# Patient Record
Sex: FEMALE | Race: OTHER | NOT HISPANIC OR LATINO | URBAN - METROPOLITAN AREA
[De-identification: names, ages, dates, MRNs, and addresses within clinical notes are randomized per-mention and may not be internally consistent; named-entity substitution may affect disease eponyms.]

---

## 2022-04-29 ENCOUNTER — OUTPATIENT (OUTPATIENT)
Dept: OUTPATIENT SERVICES | Facility: HOSPITAL | Age: 32
LOS: 1 days | End: 2022-04-29
Payer: MEDICAID

## 2022-04-29 VITALS
HEIGHT: 61 IN | DIASTOLIC BLOOD PRESSURE: 82 MMHG | WEIGHT: 130.07 LBS | SYSTOLIC BLOOD PRESSURE: 120 MMHG | RESPIRATION RATE: 16 BRPM | TEMPERATURE: 98 F | OXYGEN SATURATION: 98 % | HEART RATE: 88 BPM

## 2022-04-29 DIAGNOSIS — Z01.818 ENCOUNTER FOR OTHER PREPROCEDURAL EXAMINATION: ICD-10-CM

## 2022-04-29 DIAGNOSIS — Z90.49 ACQUIRED ABSENCE OF OTHER SPECIFIED PARTS OF DIGESTIVE TRACT: Chronic | ICD-10-CM

## 2022-04-29 DIAGNOSIS — N84.0 POLYP OF CORPUS UTERI: ICD-10-CM

## 2022-04-29 LAB
HCG SERPL-ACNC: <1 MIU/ML — SIGNIFICANT CHANGE UP
HCT VFR BLD CALC: 44.7 % — SIGNIFICANT CHANGE UP (ref 34.5–45)
HGB BLD-MCNC: 15.2 G/DL — SIGNIFICANT CHANGE UP (ref 11.5–15.5)
MCHC RBC-ENTMCNC: 31.3 PG — SIGNIFICANT CHANGE UP (ref 27–34)
MCHC RBC-ENTMCNC: 34 GM/DL — SIGNIFICANT CHANGE UP (ref 32–36)
MCV RBC AUTO: 92 FL — SIGNIFICANT CHANGE UP (ref 80–100)
NRBC # BLD: 0 /100 WBCS — SIGNIFICANT CHANGE UP (ref 0–0)
PLATELET # BLD AUTO: 236 K/UL — SIGNIFICANT CHANGE UP (ref 150–400)
RBC # BLD: 4.86 M/UL — SIGNIFICANT CHANGE UP (ref 3.8–5.2)
RBC # FLD: 11.7 % — SIGNIFICANT CHANGE UP (ref 10.3–14.5)
WBC # BLD: 6.82 K/UL — SIGNIFICANT CHANGE UP (ref 3.8–10.5)
WBC # FLD AUTO: 6.82 K/UL — SIGNIFICANT CHANGE UP (ref 3.8–10.5)

## 2022-04-29 PROCEDURE — 85027 COMPLETE CBC AUTOMATED: CPT

## 2022-04-29 PROCEDURE — 86850 RBC ANTIBODY SCREEN: CPT

## 2022-04-29 PROCEDURE — G0463: CPT

## 2022-04-29 PROCEDURE — 84702 CHORIONIC GONADOTROPIN TEST: CPT

## 2022-04-29 PROCEDURE — 86901 BLOOD TYPING SEROLOGIC RH(D): CPT

## 2022-04-29 PROCEDURE — 36415 COLL VENOUS BLD VENIPUNCTURE: CPT

## 2022-04-29 PROCEDURE — 86900 BLOOD TYPING SEROLOGIC ABO: CPT

## 2022-04-29 NOTE — H&P PST ADULT - NSANTHOSAYNRD_GEN_A_CORE
No. FAVIAN screening performed.  STOP BANG Legend: 0-2 = LOW Risk; 3-4 = INTERMEDIATE Risk; 5-8 = HIGH Risk

## 2022-04-29 NOTE — H&P PST ADULT - HISTORY OF PRESENT ILLNESS
30 yo F with no significant PMH c/o menorrhagia in 4/2022- had GYN evaluation- s/p pelvic sonogram/HSG revealed polyp of corpus uteri. Pt presents to PST for pre op evaluation for D&C/hysteroscopy on 5/6/22  **Denies any fever, chills, abdominal pain, N/V or sick contacts  **Covid 19 PCR to be scheduled

## 2022-04-29 NOTE — H&P PST ADULT - GUM GEN PE MLT EXAM PC
Called patient no answer.   Unable to leave message.   Will need to try again at later time to convey results.      patient refused

## 2022-04-29 NOTE — H&P PST ADULT - FALL HARM RISK - UNIVERSAL INTERVENTIONS
Bed in lowest position, wheels locked, appropriate side rails in place/Call bell, personal items and telephone in reach/Instruct patient to call for assistance before getting out of bed or chair/Non-slip footwear when patient is out of bed/[place] to call system/Physically safe environment - no spills, clutter or unnecessary equipment/Purposeful Proactive Rounding/Room/bathroom lighting operational, light cord in reach

## 2022-04-29 NOTE — H&P PST ADULT - PRIMARY CARE PROVIDER
Impression:  Encounter for surgical aftercare following surgery on a sense organ  Z48.810. Post operative instructions reviewed - Plan: Reviewed post-op instructions. RTC if any pain or sudden changes to vision. no PCP

## 2022-04-29 NOTE — H&P PST ADULT - NSICDXFAMILYHX_GEN_ALL_CORE_FT
FAMILY HISTORY:  Mother  Still living? Yes, Estimated age: 51-60  FH: HTN (hypertension), Age at diagnosis: Age Unknown  FHx: hypothyroidism, Age at diagnosis: Age Unknown

## 2022-05-05 RX ORDER — SODIUM CHLORIDE 9 MG/ML
1000 INJECTION, SOLUTION INTRAVENOUS
Refills: 0 | Status: DISCONTINUED | OUTPATIENT
Start: 2022-05-06 | End: 2022-05-20

## 2022-05-05 NOTE — ASU PATIENT PROFILE, ADULT - PROVIDER NOTIFICATION
Presenting with Hb of 10.3. MCV 79.9. Could be 2/2 iron deficiency anemia  -iron studies  -obtain collateral about baseline Hb   -maintain active type and screen, transfuse if Hb<7 Presenting with Hb of 10.3. MCV 79.9. Could be 2/2 iron deficiency anemia. May be chronic.   -iron studies  -obtain collateral about baseline Hb   -maintain active type and screen, transfuse if Hb<7 Declines

## 2022-05-05 NOTE — ASU PATIENT PROFILE, ADULT - FALL HARM RISK - UNIVERSAL INTERVENTIONS
Bed in lowest position, wheels locked, appropriate side rails in place/Call bell, personal items and telephone in reach/Instruct patient to call for assistance before getting out of bed or chair/Non-slip footwear when patient is out of bed/[place] to call system/Physically safe environment - no spills, clutter or unnecessary equipment/Purposeful Proactive Rounding/Room/bathroom lighting operational, light cord in reach

## 2022-05-06 ENCOUNTER — OUTPATIENT (OUTPATIENT)
Dept: OUTPATIENT SERVICES | Facility: HOSPITAL | Age: 32
LOS: 1 days | End: 2022-05-06
Payer: MEDICAID

## 2022-05-06 VITALS
HEART RATE: 77 BPM | TEMPERATURE: 98 F | OXYGEN SATURATION: 99 % | RESPIRATION RATE: 16 BRPM | SYSTOLIC BLOOD PRESSURE: 131 MMHG | DIASTOLIC BLOOD PRESSURE: 86 MMHG

## 2022-05-06 VITALS
HEIGHT: 61 IN | HEART RATE: 84 BPM | OXYGEN SATURATION: 99 % | RESPIRATION RATE: 16 BRPM | TEMPERATURE: 98 F | SYSTOLIC BLOOD PRESSURE: 137 MMHG | DIASTOLIC BLOOD PRESSURE: 88 MMHG | WEIGHT: 130.07 LBS

## 2022-05-06 DIAGNOSIS — Z90.49 ACQUIRED ABSENCE OF OTHER SPECIFIED PARTS OF DIGESTIVE TRACT: Chronic | ICD-10-CM

## 2022-05-06 DIAGNOSIS — N84.0 POLYP OF CORPUS UTERI: ICD-10-CM

## 2022-05-06 PROCEDURE — 88305 TISSUE EXAM BY PATHOLOGIST: CPT | Mod: 26

## 2022-05-06 PROCEDURE — 88305 TISSUE EXAM BY PATHOLOGIST: CPT

## 2022-05-06 PROCEDURE — 58558 HYSTEROSCOPY BIOPSY: CPT

## 2022-05-06 RX ORDER — SODIUM CHLORIDE 9 MG/ML
1000 INJECTION, SOLUTION INTRAVENOUS
Refills: 0 | Status: DISCONTINUED | OUTPATIENT
Start: 2022-05-06 | End: 2022-05-06

## 2022-05-06 RX ORDER — ONDANSETRON 8 MG/1
8 TABLET, FILM COATED ORAL EVERY 8 HOURS
Refills: 0 | Status: DISCONTINUED | OUTPATIENT
Start: 2022-05-06 | End: 2022-05-20

## 2022-05-06 RX ORDER — ACETAMINOPHEN 500 MG
1000 TABLET ORAL EVERY 6 HOURS
Refills: 0 | Status: DISCONTINUED | OUTPATIENT
Start: 2022-05-06 | End: 2022-05-20

## 2022-05-06 RX ORDER — OXYCODONE HYDROCHLORIDE 5 MG/1
5 TABLET ORAL EVERY 4 HOURS
Refills: 0 | Status: DISCONTINUED | OUTPATIENT
Start: 2022-05-06 | End: 2022-05-06

## 2022-05-06 RX ORDER — OXYCODONE HYDROCHLORIDE 5 MG/1
10 TABLET ORAL EVERY 4 HOURS
Refills: 0 | Status: DISCONTINUED | OUTPATIENT
Start: 2022-05-06 | End: 2022-05-06

## 2022-05-06 RX ORDER — ACETAMINOPHEN 500 MG
975 TABLET ORAL ONCE
Refills: 0 | Status: COMPLETED | OUTPATIENT
Start: 2022-05-06 | End: 2022-05-06

## 2022-05-06 RX ORDER — SIMETHICONE 80 MG/1
80 TABLET, CHEWABLE ORAL EVERY 6 HOURS
Refills: 0 | Status: DISCONTINUED | OUTPATIENT
Start: 2022-05-06 | End: 2022-05-20

## 2022-05-06 RX ORDER — SODIUM CHLORIDE 9 MG/ML
3 INJECTION INTRAMUSCULAR; INTRAVENOUS; SUBCUTANEOUS EVERY 8 HOURS
Refills: 0 | Status: DISCONTINUED | OUTPATIENT
Start: 2022-05-06 | End: 2022-05-20

## 2022-05-06 RX ADMIN — SODIUM CHLORIDE 75 MILLILITER(S): 9 INJECTION, SOLUTION INTRAVENOUS at 08:50

## 2022-05-06 RX ADMIN — SODIUM CHLORIDE 75 MILLILITER(S): 9 INJECTION, SOLUTION INTRAVENOUS at 14:40

## 2022-05-06 RX ADMIN — Medication 975 MILLIGRAM(S): at 08:50

## 2022-05-06 NOTE — ASU DISCHARGE PLAN (ADULT/PEDIATRIC) - CARE PROVIDER_API CALL
Kali King)  Obstetrics and Gynecology  [address] [place] [address]  Phone: [phone]  Fax: [phone]  Follow Up Time:

## 2022-05-06 NOTE — ASU DISCHARGE PLAN (ADULT/PEDIATRIC) - NS MD DC FALL RISK RISK
For information on Fall & Injury Prevention, visit: [email or web address] OR  [email or web address] OR  https://www.cdc.gov/steadi/patient.html
